# Patient Record
Sex: FEMALE | Race: BLACK OR AFRICAN AMERICAN
[De-identification: names, ages, dates, MRNs, and addresses within clinical notes are randomized per-mention and may not be internally consistent; named-entity substitution may affect disease eponyms.]

---

## 2017-03-20 ENCOUNTER — HOSPITAL ENCOUNTER (OUTPATIENT)
Dept: HOSPITAL 84 - D.US | Age: 77
Discharge: HOME | End: 2017-03-20
Attending: ORTHOPAEDIC SURGERY
Payer: MEDICARE

## 2017-03-20 VITALS — BODY MASS INDEX: 37.9 KG/M2

## 2017-03-20 DIAGNOSIS — R60.0: Primary | ICD-10-CM

## 2017-05-02 ENCOUNTER — HOSPITAL ENCOUNTER (OUTPATIENT)
Dept: HOSPITAL 84 - D.RT | Age: 77
Discharge: HOME | End: 2017-05-02
Attending: INTERNAL MEDICINE
Payer: MEDICARE

## 2017-05-02 VITALS — BODY MASS INDEX: 37.9 KG/M2

## 2017-05-02 DIAGNOSIS — J45.909: Primary | ICD-10-CM

## 2017-05-02 DIAGNOSIS — R06.02: ICD-10-CM

## 2017-05-02 LAB
BUN SERPL-MCNC: 19 MG/DL (ref 7–18)
CREAT SERPL-MCNC: 1 MG/DL (ref 0.6–1.3)

## 2017-11-10 ENCOUNTER — HOSPITAL ENCOUNTER (OUTPATIENT)
Dept: HOSPITAL 84 - D.CT | Age: 77
Discharge: HOME | End: 2017-11-10
Attending: FAMILY MEDICINE
Payer: MEDICARE

## 2017-11-10 VITALS — BODY MASS INDEX: 42.3 KG/M2 | BODY MASS INDEX: 37.9 KG/M2

## 2017-11-10 DIAGNOSIS — R51: ICD-10-CM

## 2017-11-10 DIAGNOSIS — S00.03XA: Primary | ICD-10-CM

## 2018-02-05 ENCOUNTER — HOSPITAL ENCOUNTER (INPATIENT)
Dept: HOSPITAL 84 - D.ER | Age: 78
LOS: 7 days | Discharge: HOME | DRG: 175 | End: 2018-02-12
Attending: FAMILY MEDICINE | Admitting: FAMILY MEDICINE
Payer: MEDICARE

## 2018-02-05 VITALS
BODY MASS INDEX: 41.82 KG/M2 | BODY MASS INDEX: 41.82 KG/M2 | WEIGHT: 207.44 LBS | WEIGHT: 207.44 LBS | BODY MASS INDEX: 41.82 KG/M2 | BODY MASS INDEX: 41.82 KG/M2 | HEIGHT: 59 IN | HEIGHT: 59 IN

## 2018-02-05 DIAGNOSIS — J96.01: ICD-10-CM

## 2018-02-05 DIAGNOSIS — I10: ICD-10-CM

## 2018-02-05 DIAGNOSIS — Z79.01: ICD-10-CM

## 2018-02-05 DIAGNOSIS — R79.89: ICD-10-CM

## 2018-02-05 DIAGNOSIS — I26.02: Primary | ICD-10-CM

## 2018-02-05 DIAGNOSIS — E87.6: ICD-10-CM

## 2018-02-05 DIAGNOSIS — K76.89: ICD-10-CM

## 2018-02-05 DIAGNOSIS — J45.909: ICD-10-CM

## 2018-02-05 DIAGNOSIS — Z86.718: ICD-10-CM

## 2018-02-05 LAB
ALBUMIN SERPL-MCNC: 3.6 G/DL (ref 3.4–5)
ALP SERPL-CCNC: 111 U/L (ref 46–116)
ALT SERPL-CCNC: 22 U/L (ref 10–68)
ANION GAP SERPL CALC-SCNC: 17.9 MMOL/L (ref 8–16)
BASOPHILS NFR BLD AUTO: 0.3 % (ref 0–2)
BILIRUB SERPL-MCNC: 0.38 MG/DL (ref 0.2–1.3)
BUN SERPL-MCNC: 13 MG/DL (ref 7–18)
CALCIUM SERPL-MCNC: 8.8 MG/DL (ref 8.5–10.1)
CHLORIDE SERPL-SCNC: 102 MMOL/L (ref 98–107)
CHOLEST/HDLC SERPL: 4.4 RATIO (ref 2.3–4.1)
CK MB SERPL-MCNC: 3.6 U/L (ref 0–3.6)
CK SERPL-CCNC: 239 UL (ref 21–215)
CO2 SERPL-SCNC: 24.1 MMOL/L (ref 21–32)
CREAT SERPL-MCNC: 1.1 MG/DL (ref 0.6–1.3)
EOSINOPHIL NFR BLD: 0.4 % (ref 0–7)
ERYTHROCYTE [DISTWIDTH] IN BLOOD BY AUTOMATED COUNT: 14.6 % (ref 11.5–14.5)
GLOBULIN SER-MCNC: 3.9 G/L
GLUCOSE SERPL-MCNC: 132 MG/DL (ref 74–106)
HCT VFR BLD CALC: 41.1 % (ref 36–48)
HDLC SERPL-MCNC: 50 MG/DL (ref 32–96)
HGB BLD-MCNC: 13.6 G/DL (ref 12–16)
IMM GRANULOCYTES NFR BLD: 0.1 % (ref 0–5)
LDL-HDL RATIO: 2.9 RATIO (ref 1.5–3.5)
LDLC SERPL-MCNC: 144 MG/DL (ref 0–100)
LYMPHOCYTES NFR BLD AUTO: 35.8 % (ref 15–50)
MAGNESIUM SERPL-MCNC: 2.2 MG/DL (ref 1.8–2.4)
MCH RBC QN AUTO: 29.6 PG (ref 26–34)
MCHC RBC AUTO-ENTMCNC: 33.1 G/DL (ref 31–37)
MCV RBC: 89.3 FL (ref 80–100)
MONOCYTES NFR BLD: 7.7 % (ref 2–11)
NEUTROPHILS NFR BLD AUTO: 55.7 % (ref 40–80)
NT-PROBNP SERPL-MCNC: 3849 PG/ML (ref 0–450)
OSMOLALITY SERPL CALC.SUM OF ELEC: 280 MOSM/KG (ref 275–300)
PLATELET # BLD: 257 10X3/UL (ref 130–400)
PMV BLD AUTO: 9 FL (ref 7.4–10.4)
POTASSIUM SERPL-SCNC: 4 MMOL/L (ref 3.5–5.1)
PROT SERPL-MCNC: 7.5 G/DL (ref 6.4–8.2)
RBC # BLD AUTO: 4.6 10X6/UL (ref 4–5.4)
SODIUM SERPL-SCNC: 140 MMOL/L (ref 136–145)
TRIGL SERPL-MCNC: 139 MG/DL (ref 30–200)
TROPONIN I SERPL-MCNC: 0.63 NG/ML (ref 0–0.06)
TSH SERPL-ACNC: 1.76 UIU/ML (ref 0.36–3.74)
WBC # BLD AUTO: 7 10X3/UL (ref 4.8–10.8)

## 2018-02-06 VITALS — DIASTOLIC BLOOD PRESSURE: 81 MMHG | SYSTOLIC BLOOD PRESSURE: 139 MMHG

## 2018-02-06 VITALS — SYSTOLIC BLOOD PRESSURE: 121 MMHG | DIASTOLIC BLOOD PRESSURE: 67 MMHG

## 2018-02-06 VITALS — DIASTOLIC BLOOD PRESSURE: 64 MMHG | SYSTOLIC BLOOD PRESSURE: 131 MMHG

## 2018-02-06 VITALS — SYSTOLIC BLOOD PRESSURE: 126 MMHG | DIASTOLIC BLOOD PRESSURE: 63 MMHG

## 2018-02-06 VITALS — DIASTOLIC BLOOD PRESSURE: 59 MMHG | SYSTOLIC BLOOD PRESSURE: 116 MMHG

## 2018-02-06 VITALS — DIASTOLIC BLOOD PRESSURE: 60 MMHG | SYSTOLIC BLOOD PRESSURE: 141 MMHG

## 2018-02-06 VITALS — SYSTOLIC BLOOD PRESSURE: 136 MMHG | DIASTOLIC BLOOD PRESSURE: 64 MMHG

## 2018-02-06 VITALS — DIASTOLIC BLOOD PRESSURE: 57 MMHG | SYSTOLIC BLOOD PRESSURE: 122 MMHG

## 2018-02-06 VITALS — SYSTOLIC BLOOD PRESSURE: 112 MMHG | DIASTOLIC BLOOD PRESSURE: 67 MMHG

## 2018-02-06 VITALS — DIASTOLIC BLOOD PRESSURE: 58 MMHG | SYSTOLIC BLOOD PRESSURE: 112 MMHG

## 2018-02-06 VITALS — SYSTOLIC BLOOD PRESSURE: 119 MMHG | DIASTOLIC BLOOD PRESSURE: 72 MMHG

## 2018-02-06 VITALS — SYSTOLIC BLOOD PRESSURE: 113 MMHG | DIASTOLIC BLOOD PRESSURE: 71 MMHG

## 2018-02-06 VITALS — DIASTOLIC BLOOD PRESSURE: 55 MMHG | SYSTOLIC BLOOD PRESSURE: 111 MMHG

## 2018-02-06 LAB
ALBUMIN SERPL-MCNC: 3.2 G/DL (ref 3.4–5)
ALP SERPL-CCNC: 106 U/L (ref 46–116)
ALT SERPL-CCNC: 20 U/L (ref 10–68)
ANION GAP SERPL CALC-SCNC: 14.1 MMOL/L (ref 8–16)
BASOPHILS NFR BLD AUTO: 0.4 % (ref 0–2)
BILIRUB SERPL-MCNC: 0.39 MG/DL (ref 0.2–1.3)
BUN SERPL-MCNC: 11 MG/DL (ref 7–18)
CALCIUM SERPL-MCNC: 8.2 MG/DL (ref 8.5–10.1)
CHLORIDE SERPL-SCNC: 105 MMOL/L (ref 98–107)
CK MB SERPL-MCNC: 2.9 U/L (ref 0–3.6)
CK MB SERPL-MCNC: 3.3 U/L (ref 0–3.6)
CK MB SERPL-MCNC: 3.8 U/L (ref 0–3.6)
CK SERPL-CCNC: 212 UL (ref 21–215)
CK SERPL-CCNC: 219 UL (ref 21–215)
CK SERPL-CCNC: 251 UL (ref 21–215)
CO2 SERPL-SCNC: 27 MMOL/L (ref 21–32)
CREAT SERPL-MCNC: 1 MG/DL (ref 0.6–1.3)
EOSINOPHIL NFR BLD: 0.7 % (ref 0–7)
ERYTHROCYTE [DISTWIDTH] IN BLOOD BY AUTOMATED COUNT: 14.8 % (ref 11.5–14.5)
GLOBULIN SER-MCNC: 3.3 G/L
GLUCOSE SERPL-MCNC: 108 MG/DL (ref 74–106)
HCT VFR BLD CALC: 40.2 % (ref 36–48)
HGB BLD-MCNC: 12.9 G/DL (ref 12–16)
IMM GRANULOCYTES NFR BLD: 0.2 % (ref 0–5)
LYMPHOCYTES NFR BLD AUTO: 49.8 % (ref 15–50)
MCH RBC QN AUTO: 28.9 PG (ref 26–34)
MCHC RBC AUTO-ENTMCNC: 32.1 G/DL (ref 31–37)
MCV RBC: 90.1 FL (ref 80–100)
MONOCYTES NFR BLD: 6.1 % (ref 2–11)
NEUTROPHILS NFR BLD AUTO: 42.8 % (ref 40–80)
OSMOLALITY SERPL CALC.SUM OF ELEC: 282 MOSM/KG (ref 275–300)
PLATELET # BLD: 260 10X3/UL (ref 130–400)
PMV BLD AUTO: 9.2 FL (ref 7.4–10.4)
POTASSIUM SERPL-SCNC: 4.1 MMOL/L (ref 3.5–5.1)
PROT SERPL-MCNC: 6.5 G/DL (ref 6.4–8.2)
RBC # BLD AUTO: 4.46 10X6/UL (ref 4–5.4)
SODIUM SERPL-SCNC: 142 MMOL/L (ref 136–145)
TROPONIN I SERPL-MCNC: 0.27 NG/ML (ref 0–0.06)
TROPONIN I SERPL-MCNC: 0.35 NG/ML (ref 0–0.06)
TROPONIN I SERPL-MCNC: 0.58 NG/ML (ref 0–0.06)
WBC # BLD AUTO: 8.5 10X3/UL (ref 4.8–10.8)

## 2018-02-07 VITALS — DIASTOLIC BLOOD PRESSURE: 55 MMHG | SYSTOLIC BLOOD PRESSURE: 117 MMHG

## 2018-02-07 VITALS — DIASTOLIC BLOOD PRESSURE: 65 MMHG | SYSTOLIC BLOOD PRESSURE: 137 MMHG

## 2018-02-07 VITALS — SYSTOLIC BLOOD PRESSURE: 143 MMHG | DIASTOLIC BLOOD PRESSURE: 79 MMHG

## 2018-02-07 VITALS — DIASTOLIC BLOOD PRESSURE: 67 MMHG | SYSTOLIC BLOOD PRESSURE: 116 MMHG

## 2018-02-07 VITALS — DIASTOLIC BLOOD PRESSURE: 73 MMHG | SYSTOLIC BLOOD PRESSURE: 150 MMHG

## 2018-02-07 VITALS — SYSTOLIC BLOOD PRESSURE: 130 MMHG | DIASTOLIC BLOOD PRESSURE: 58 MMHG

## 2018-02-07 VITALS — SYSTOLIC BLOOD PRESSURE: 120 MMHG | DIASTOLIC BLOOD PRESSURE: 68 MMHG

## 2018-02-07 VITALS — DIASTOLIC BLOOD PRESSURE: 66 MMHG | SYSTOLIC BLOOD PRESSURE: 119 MMHG

## 2018-02-07 VITALS — SYSTOLIC BLOOD PRESSURE: 116 MMHG | DIASTOLIC BLOOD PRESSURE: 71 MMHG

## 2018-02-07 VITALS — DIASTOLIC BLOOD PRESSURE: 75 MMHG | SYSTOLIC BLOOD PRESSURE: 154 MMHG

## 2018-02-07 VITALS — SYSTOLIC BLOOD PRESSURE: 130 MMHG | DIASTOLIC BLOOD PRESSURE: 69 MMHG

## 2018-02-07 VITALS — DIASTOLIC BLOOD PRESSURE: 63 MMHG | SYSTOLIC BLOOD PRESSURE: 130 MMHG

## 2018-02-07 VITALS — DIASTOLIC BLOOD PRESSURE: 76 MMHG | SYSTOLIC BLOOD PRESSURE: 125 MMHG

## 2018-02-07 VITALS — SYSTOLIC BLOOD PRESSURE: 130 MMHG | DIASTOLIC BLOOD PRESSURE: 64 MMHG

## 2018-02-07 VITALS — DIASTOLIC BLOOD PRESSURE: 70 MMHG | SYSTOLIC BLOOD PRESSURE: 136 MMHG

## 2018-02-07 VITALS — SYSTOLIC BLOOD PRESSURE: 145 MMHG | DIASTOLIC BLOOD PRESSURE: 73 MMHG

## 2018-02-07 VITALS — SYSTOLIC BLOOD PRESSURE: 162 MMHG | DIASTOLIC BLOOD PRESSURE: 74 MMHG

## 2018-02-07 VITALS — DIASTOLIC BLOOD PRESSURE: 66 MMHG | SYSTOLIC BLOOD PRESSURE: 156 MMHG

## 2018-02-07 VITALS — DIASTOLIC BLOOD PRESSURE: 62 MMHG | SYSTOLIC BLOOD PRESSURE: 118 MMHG

## 2018-02-07 VITALS — SYSTOLIC BLOOD PRESSURE: 117 MMHG | DIASTOLIC BLOOD PRESSURE: 66 MMHG

## 2018-02-07 LAB
ALBUMIN SERPL-MCNC: 3.1 G/DL (ref 3.4–5)
ALP SERPL-CCNC: 97 U/L (ref 46–116)
ALT SERPL-CCNC: 19 U/L (ref 10–68)
ANION GAP SERPL CALC-SCNC: 15 MMOL/L (ref 8–16)
BASOPHILS NFR BLD AUTO: 0.2 % (ref 0–2)
BILIRUB SERPL-MCNC: 0.37 MG/DL (ref 0.2–1.3)
BUN SERPL-MCNC: 10 MG/DL (ref 7–18)
CALCIUM SERPL-MCNC: 8.2 MG/DL (ref 8.5–10.1)
CARDIOLIPIN IGG SER IA-ACNC: <9 GPL U/ML (ref 0–14)
CARDIOLIPIN IGM SER IA-ACNC: 12 MPL U/ML (ref 0–12)
CHLORIDE SERPL-SCNC: 105 MMOL/L (ref 98–107)
CO2 SERPL-SCNC: 23.4 MMOL/L (ref 21–32)
CREAT SERPL-MCNC: 1 MG/DL (ref 0.6–1.3)
EOSINOPHIL NFR BLD: 0.7 % (ref 0–7)
ERYTHROCYTE [DISTWIDTH] IN BLOOD BY AUTOMATED COUNT: 14.8 % (ref 11.5–14.5)
GLOBULIN SER-MCNC: 3.5 G/L
GLUCOSE SERPL-MCNC: 146 MG/DL (ref 74–106)
HCT VFR BLD CALC: 37.5 % (ref 36–48)
HGB BLD-MCNC: 12 G/DL (ref 12–16)
IMM GRANULOCYTES NFR BLD: 0.2 % (ref 0–5)
LYMPHOCYTES NFR BLD AUTO: 44.8 % (ref 15–50)
MCH RBC QN AUTO: 28.8 PG (ref 26–34)
MCHC RBC AUTO-ENTMCNC: 32 G/DL (ref 31–37)
MCV RBC: 89.9 FL (ref 80–100)
MONOCYTES NFR BLD: 7.8 % (ref 2–11)
NEUTROPHILS NFR BLD AUTO: 46.3 % (ref 40–80)
OSMOLALITY SERPL CALC.SUM OF ELEC: 280 MOSM/KG (ref 275–300)
PLATELET # BLD: 277 10X3/UL (ref 130–400)
PMV BLD AUTO: 9.3 FL (ref 7.4–10.4)
POTASSIUM SERPL-SCNC: 3.4 MMOL/L (ref 3.5–5.1)
PROT SERPL-MCNC: 6.6 G/DL (ref 6.4–8.2)
RBC # BLD AUTO: 4.17 10X6/UL (ref 4–5.4)
SODIUM SERPL-SCNC: 140 MMOL/L (ref 136–145)
WBC # BLD AUTO: 8.1 10X3/UL (ref 4.8–10.8)

## 2018-02-08 VITALS — DIASTOLIC BLOOD PRESSURE: 78 MMHG | SYSTOLIC BLOOD PRESSURE: 149 MMHG

## 2018-02-08 VITALS — SYSTOLIC BLOOD PRESSURE: 124 MMHG | DIASTOLIC BLOOD PRESSURE: 58 MMHG

## 2018-02-08 VITALS — SYSTOLIC BLOOD PRESSURE: 130 MMHG | DIASTOLIC BLOOD PRESSURE: 70 MMHG

## 2018-02-08 VITALS — SYSTOLIC BLOOD PRESSURE: 139 MMHG | DIASTOLIC BLOOD PRESSURE: 64 MMHG

## 2018-02-08 VITALS — SYSTOLIC BLOOD PRESSURE: 122 MMHG | DIASTOLIC BLOOD PRESSURE: 76 MMHG

## 2018-02-08 VITALS — SYSTOLIC BLOOD PRESSURE: 147 MMHG | DIASTOLIC BLOOD PRESSURE: 64 MMHG

## 2018-02-08 VITALS — DIASTOLIC BLOOD PRESSURE: 93 MMHG | SYSTOLIC BLOOD PRESSURE: 134 MMHG

## 2018-02-08 VITALS — DIASTOLIC BLOOD PRESSURE: 62 MMHG | SYSTOLIC BLOOD PRESSURE: 125 MMHG

## 2018-02-08 VITALS — DIASTOLIC BLOOD PRESSURE: 64 MMHG | SYSTOLIC BLOOD PRESSURE: 147 MMHG

## 2018-02-08 VITALS — DIASTOLIC BLOOD PRESSURE: 82 MMHG | SYSTOLIC BLOOD PRESSURE: 133 MMHG

## 2018-02-08 VITALS — DIASTOLIC BLOOD PRESSURE: 69 MMHG | SYSTOLIC BLOOD PRESSURE: 145 MMHG

## 2018-02-08 VITALS — DIASTOLIC BLOOD PRESSURE: 76 MMHG | SYSTOLIC BLOOD PRESSURE: 119 MMHG

## 2018-02-08 VITALS — DIASTOLIC BLOOD PRESSURE: 74 MMHG | SYSTOLIC BLOOD PRESSURE: 130 MMHG

## 2018-02-08 VITALS — SYSTOLIC BLOOD PRESSURE: 136 MMHG | DIASTOLIC BLOOD PRESSURE: 70 MMHG

## 2018-02-08 VITALS — DIASTOLIC BLOOD PRESSURE: 68 MMHG | SYSTOLIC BLOOD PRESSURE: 143 MMHG

## 2018-02-08 VITALS — SYSTOLIC BLOOD PRESSURE: 152 MMHG | DIASTOLIC BLOOD PRESSURE: 66 MMHG

## 2018-02-08 VITALS — SYSTOLIC BLOOD PRESSURE: 122 MMHG | DIASTOLIC BLOOD PRESSURE: 92 MMHG

## 2018-02-08 VITALS — DIASTOLIC BLOOD PRESSURE: 68 MMHG | SYSTOLIC BLOOD PRESSURE: 117 MMHG

## 2018-02-08 VITALS — SYSTOLIC BLOOD PRESSURE: 134 MMHG | DIASTOLIC BLOOD PRESSURE: 93 MMHG

## 2018-02-08 VITALS — SYSTOLIC BLOOD PRESSURE: 132 MMHG | DIASTOLIC BLOOD PRESSURE: 48 MMHG

## 2018-02-08 VITALS — DIASTOLIC BLOOD PRESSURE: 64 MMHG | SYSTOLIC BLOOD PRESSURE: 139 MMHG

## 2018-02-08 LAB
ALBUMIN SERPL-MCNC: 2.8 G/DL (ref 3.4–5)
ALP SERPL-CCNC: 89 U/L (ref 46–116)
ALT SERPL-CCNC: 18 U/L (ref 10–68)
ANION GAP SERPL CALC-SCNC: 13.1 MMOL/L (ref 8–16)
AT III ACT/NOR PPP CHRO: 121 % (ref 75–135)
BASOPHILS NFR BLD AUTO: 0.1 % (ref 0–2)
BILIRUB SERPL-MCNC: 0.49 MG/DL (ref 0.2–1.3)
BUN SERPL-MCNC: 7 MG/DL (ref 7–18)
CALCIUM SERPL-MCNC: 8.2 MG/DL (ref 8.5–10.1)
CHLORIDE SERPL-SCNC: 107 MMOL/L (ref 98–107)
CO2 SERPL-SCNC: 24.3 MMOL/L (ref 21–32)
CREAT SERPL-MCNC: 0.9 MG/DL (ref 0.6–1.3)
EOSINOPHIL NFR BLD: 1 % (ref 0–7)
ERYTHROCYTE [DISTWIDTH] IN BLOOD BY AUTOMATED COUNT: 14.9 % (ref 11.5–14.5)
GLOBULIN SER-MCNC: 3.5 G/L
GLUCOSE SERPL-MCNC: 139 MG/DL (ref 74–106)
HCT VFR BLD CALC: 34.8 % (ref 36–48)
HGB BLD-MCNC: 11.2 G/DL (ref 12–16)
IMM GRANULOCYTES NFR BLD: 0.1 % (ref 0–5)
LYMPHOCYTES NFR BLD AUTO: 34 % (ref 15–50)
MCH RBC QN AUTO: 29.2 PG (ref 26–34)
MCHC RBC AUTO-ENTMCNC: 32.2 G/DL (ref 31–37)
MCV RBC: 90.9 FL (ref 80–100)
MONOCYTES NFR BLD: 5.6 % (ref 2–11)
NEUTROPHILS NFR BLD AUTO: 59.2 % (ref 40–80)
OSMOLALITY SERPL CALC.SUM OF ELEC: 280 MOSM/KG (ref 275–300)
PLATELET # BLD: 288 10X3/UL (ref 130–400)
PMV BLD AUTO: 9.2 FL (ref 7.4–10.4)
POTASSIUM SERPL-SCNC: 3.4 MMOL/L (ref 3.5–5.1)
PROT S ACT/NOR PPP: 100 % (ref 57–157)
PROT S ACT/NOR PPP: 93 % (ref 63–140)
PROT S PPP-ACNC: 132 % (ref 60–150)
PROT SERPL-MCNC: 6.3 G/DL (ref 6.4–8.2)
RBC # BLD AUTO: 3.83 10X6/UL (ref 4–5.4)
SODIUM SERPL-SCNC: 141 MMOL/L (ref 136–145)
WBC # BLD AUTO: 9.2 10X3/UL (ref 4.8–10.8)

## 2018-02-09 VITALS — SYSTOLIC BLOOD PRESSURE: 165 MMHG | DIASTOLIC BLOOD PRESSURE: 68 MMHG

## 2018-02-09 VITALS — SYSTOLIC BLOOD PRESSURE: 130 MMHG | DIASTOLIC BLOOD PRESSURE: 70 MMHG

## 2018-02-09 VITALS — SYSTOLIC BLOOD PRESSURE: 126 MMHG | DIASTOLIC BLOOD PRESSURE: 57 MMHG

## 2018-02-09 VITALS — DIASTOLIC BLOOD PRESSURE: 69 MMHG | SYSTOLIC BLOOD PRESSURE: 119 MMHG

## 2018-02-09 VITALS — SYSTOLIC BLOOD PRESSURE: 130 MMHG | DIASTOLIC BLOOD PRESSURE: 62 MMHG

## 2018-02-09 VITALS — SYSTOLIC BLOOD PRESSURE: 119 MMHG | DIASTOLIC BLOOD PRESSURE: 64 MMHG

## 2018-02-09 VITALS — DIASTOLIC BLOOD PRESSURE: 70 MMHG | SYSTOLIC BLOOD PRESSURE: 130 MMHG

## 2018-02-09 VITALS — SYSTOLIC BLOOD PRESSURE: 148 MMHG | DIASTOLIC BLOOD PRESSURE: 66 MMHG

## 2018-02-09 VITALS — DIASTOLIC BLOOD PRESSURE: 56 MMHG | SYSTOLIC BLOOD PRESSURE: 140 MMHG

## 2018-02-09 VITALS — SYSTOLIC BLOOD PRESSURE: 129 MMHG | DIASTOLIC BLOOD PRESSURE: 47 MMHG

## 2018-02-09 VITALS — SYSTOLIC BLOOD PRESSURE: 123 MMHG | DIASTOLIC BLOOD PRESSURE: 57 MMHG

## 2018-02-09 VITALS — DIASTOLIC BLOOD PRESSURE: 54 MMHG | SYSTOLIC BLOOD PRESSURE: 127 MMHG

## 2018-02-09 VITALS — SYSTOLIC BLOOD PRESSURE: 123 MMHG | DIASTOLIC BLOOD PRESSURE: 62 MMHG

## 2018-02-09 VITALS — DIASTOLIC BLOOD PRESSURE: 82 MMHG | SYSTOLIC BLOOD PRESSURE: 147 MMHG

## 2018-02-09 VITALS — DIASTOLIC BLOOD PRESSURE: 55 MMHG | SYSTOLIC BLOOD PRESSURE: 138 MMHG

## 2018-02-09 VITALS — SYSTOLIC BLOOD PRESSURE: 151 MMHG | DIASTOLIC BLOOD PRESSURE: 67 MMHG

## 2018-02-09 VITALS — DIASTOLIC BLOOD PRESSURE: 71 MMHG | SYSTOLIC BLOOD PRESSURE: 120 MMHG

## 2018-02-09 LAB
ALBUMIN SERPL-MCNC: 3.1 G/DL (ref 3.4–5)
ALP SERPL-CCNC: 108 U/L (ref 46–116)
ALT SERPL-CCNC: 25 U/L (ref 10–68)
ANION GAP SERPL CALC-SCNC: 15.4 MMOL/L (ref 8–16)
APTT 1H NP PPP: 28.1 SEC (ref 22.9–30.2)
APTT IMM NP PPP: 26.3 SEC (ref 22.9–30.2)
APTT PPP 1:1 SALINE: >121.9 SEC
APTT PPP: 30.8 SEC (ref 22.9–30.2)
BASOPHILS NFR BLD AUTO: 0.2 % (ref 0–2)
BILIRUB SERPL-MCNC: 0.21 MG/DL (ref 0.2–1.3)
BUN SERPL-MCNC: 7 MG/DL (ref 7–18)
CALCIUM SERPL-MCNC: 8.4 MG/DL (ref 8.5–10.1)
CHLORIDE SERPL-SCNC: 105 MMOL/L (ref 98–107)
CO2 SERPL-SCNC: 23.3 MMOL/L (ref 21–32)
CREAT SERPL-MCNC: 1 MG/DL (ref 0.6–1.3)
EOSINOPHIL NFR BLD: 0.9 % (ref 0–7)
ERYTHROCYTE [DISTWIDTH] IN BLOOD BY AUTOMATED COUNT: 14.9 % (ref 11.5–14.5)
FACT VIII ACT/NOR PPP: 178 % (ref 57–163)
GLOBULIN SER-MCNC: 3.9 G/L
GLUCOSE SERPL-MCNC: 149 MG/DL (ref 74–106)
HCT VFR BLD CALC: 35.1 % (ref 36–48)
HGB BLD-MCNC: 11.2 G/DL (ref 12–16)
IMM GRANULOCYTES NFR BLD: 0.3 % (ref 0–5)
LYMPHOCYTES NFR BLD AUTO: 21.7 % (ref 15–50)
MCH RBC QN AUTO: 29 PG (ref 26–34)
MCHC RBC AUTO-ENTMCNC: 31.9 G/DL (ref 31–37)
MCV RBC: 90.9 FL (ref 80–100)
MONOCYTES NFR BLD: 6 % (ref 2–11)
NEUTROPHILS NFR BLD AUTO: 70.9 % (ref 40–80)
OSMOLALITY SERPL CALC.SUM OF ELEC: 279 MOSM/KG (ref 275–300)
PLATELET # BLD: 274 10X3/UL (ref 130–400)
PMV BLD AUTO: 8.8 FL (ref 7.4–10.4)
POTASSIUM SERPL-SCNC: 3.7 MMOL/L (ref 3.5–5.1)
PROT C ACT/NOR PPP: 85 % (ref 73–180)
PROT C AG PPP IA-ACNC: 75 % (ref 60–150)
PROT SERPL-MCNC: 7 G/DL (ref 6.4–8.2)
RBC # BLD AUTO: 3.86 10X6/UL (ref 4–5.4)
SODIUM SERPL-SCNC: 140 MMOL/L (ref 136–145)
WBC # BLD AUTO: 10.9 10X3/UL (ref 4.8–10.8)

## 2018-02-10 VITALS — SYSTOLIC BLOOD PRESSURE: 146 MMHG | DIASTOLIC BLOOD PRESSURE: 65 MMHG

## 2018-02-10 VITALS — DIASTOLIC BLOOD PRESSURE: 67 MMHG | SYSTOLIC BLOOD PRESSURE: 151 MMHG

## 2018-02-10 VITALS — SYSTOLIC BLOOD PRESSURE: 161 MMHG | DIASTOLIC BLOOD PRESSURE: 67 MMHG

## 2018-02-10 VITALS — DIASTOLIC BLOOD PRESSURE: 75 MMHG | SYSTOLIC BLOOD PRESSURE: 150 MMHG

## 2018-02-10 VITALS — SYSTOLIC BLOOD PRESSURE: 146 MMHG | DIASTOLIC BLOOD PRESSURE: 68 MMHG

## 2018-02-10 VITALS — SYSTOLIC BLOOD PRESSURE: 150 MMHG | DIASTOLIC BLOOD PRESSURE: 72 MMHG

## 2018-02-10 VITALS — SYSTOLIC BLOOD PRESSURE: 151 MMHG | DIASTOLIC BLOOD PRESSURE: 67 MMHG

## 2018-02-10 VITALS — SYSTOLIC BLOOD PRESSURE: 117 MMHG | DIASTOLIC BLOOD PRESSURE: 57 MMHG

## 2018-02-10 VITALS — DIASTOLIC BLOOD PRESSURE: 69 MMHG | SYSTOLIC BLOOD PRESSURE: 121 MMHG

## 2018-02-10 LAB
ALBUMIN SERPL-MCNC: 2.8 G/DL (ref 3.4–5)
ALP SERPL-CCNC: 102 U/L (ref 46–116)
ALT SERPL-CCNC: 29 U/L (ref 10–68)
ANION GAP SERPL CALC-SCNC: 13.1 MMOL/L (ref 8–16)
BASOPHILS NFR BLD AUTO: 0.1 % (ref 0–2)
BILIRUB SERPL-MCNC: 0.3 MG/DL (ref 0.2–1.3)
BUN SERPL-MCNC: 5 MG/DL (ref 7–18)
CALCIUM SERPL-MCNC: 8.3 MG/DL (ref 8.5–10.1)
CHLORIDE SERPL-SCNC: 107 MMOL/L (ref 98–107)
CO2 SERPL-SCNC: 25.3 MMOL/L (ref 21–32)
CREAT SERPL-MCNC: 0.8 MG/DL (ref 0.6–1.3)
EOSINOPHIL NFR BLD: 1.1 % (ref 0–7)
ERYTHROCYTE [DISTWIDTH] IN BLOOD BY AUTOMATED COUNT: 14.8 % (ref 11.5–14.5)
GLOBULIN SER-MCNC: 3.6 G/L
GLUCOSE SERPL-MCNC: 117 MG/DL (ref 74–106)
HCT VFR BLD CALC: 33.1 % (ref 36–48)
HGB BLD-MCNC: 10.5 G/DL (ref 12–16)
IMM GRANULOCYTES NFR BLD: 0.2 % (ref 0–5)
LYMPHOCYTES NFR BLD AUTO: 20.6 % (ref 15–50)
MCH RBC QN AUTO: 28.6 PG (ref 26–34)
MCHC RBC AUTO-ENTMCNC: 31.7 G/DL (ref 31–37)
MCV RBC: 90.2 FL (ref 80–100)
MONOCYTES NFR BLD: 3.6 % (ref 2–11)
NEUTROPHILS NFR BLD AUTO: 74.4 % (ref 40–80)
OSMOLALITY SERPL CALC.SUM OF ELEC: 280 MOSM/KG (ref 275–300)
PLATELET # BLD: 294 10X3/UL (ref 130–400)
PMV BLD AUTO: 8.8 FL (ref 7.4–10.4)
POTASSIUM SERPL-SCNC: 3.4 MMOL/L (ref 3.5–5.1)
PROT SERPL-MCNC: 6.4 G/DL (ref 6.4–8.2)
RBC # BLD AUTO: 3.67 10X6/UL (ref 4–5.4)
SODIUM SERPL-SCNC: 142 MMOL/L (ref 136–145)
WBC # BLD AUTO: 9.4 10X3/UL (ref 4.8–10.8)

## 2018-02-11 VITALS — DIASTOLIC BLOOD PRESSURE: 68 MMHG | SYSTOLIC BLOOD PRESSURE: 154 MMHG

## 2018-02-11 VITALS — DIASTOLIC BLOOD PRESSURE: 78 MMHG | SYSTOLIC BLOOD PRESSURE: 143 MMHG

## 2018-02-11 VITALS — DIASTOLIC BLOOD PRESSURE: 72 MMHG | SYSTOLIC BLOOD PRESSURE: 177 MMHG

## 2018-02-11 VITALS — DIASTOLIC BLOOD PRESSURE: 65 MMHG | SYSTOLIC BLOOD PRESSURE: 151 MMHG

## 2018-02-11 VITALS — SYSTOLIC BLOOD PRESSURE: 149 MMHG | DIASTOLIC BLOOD PRESSURE: 60 MMHG

## 2018-02-11 LAB
ALBUMIN SERPL-MCNC: 3 G/DL (ref 3.4–5)
ALP SERPL-CCNC: 113 U/L (ref 46–116)
ALT SERPL-CCNC: 32 U/L (ref 10–68)
ANION GAP SERPL CALC-SCNC: 11.8 MMOL/L (ref 8–16)
BASOPHILS NFR BLD AUTO: 0.2 % (ref 0–2)
BILIRUB SERPL-MCNC: 0.2 MG/DL (ref 0.2–1.3)
BUN SERPL-MCNC: 5 MG/DL (ref 7–18)
CALCIUM SERPL-MCNC: 8.5 MG/DL (ref 8.5–10.1)
CHLORIDE SERPL-SCNC: 106 MMOL/L (ref 98–107)
CO2 SERPL-SCNC: 26.4 MMOL/L (ref 21–32)
CREAT SERPL-MCNC: 0.9 MG/DL (ref 0.6–1.3)
EOSINOPHIL NFR BLD: 1.2 % (ref 0–7)
ERYTHROCYTE [DISTWIDTH] IN BLOOD BY AUTOMATED COUNT: 14.7 % (ref 11.5–14.5)
GLOBULIN SER-MCNC: 3.7 G/L
GLUCOSE SERPL-MCNC: 117 MG/DL (ref 74–106)
HCT VFR BLD CALC: 35.3 % (ref 36–48)
HGB BLD-MCNC: 11.1 G/DL (ref 12–16)
IMM GRANULOCYTES NFR BLD: 0.4 % (ref 0–5)
LYMPHOCYTES NFR BLD AUTO: 25.5 % (ref 15–50)
MCH RBC QN AUTO: 28.4 PG (ref 26–34)
MCHC RBC AUTO-ENTMCNC: 31.4 G/DL (ref 31–37)
MCV RBC: 90.3 FL (ref 80–100)
MONOCYTES NFR BLD: 5.5 % (ref 2–11)
NEUTROPHILS NFR BLD AUTO: 67.2 % (ref 40–80)
OSMOLALITY SERPL CALC.SUM OF ELEC: 278 MOSM/KG (ref 275–300)
PLATELET # BLD: 352 10X3/UL (ref 130–400)
PMV BLD AUTO: 8.9 FL (ref 7.4–10.4)
POTASSIUM SERPL-SCNC: 3.2 MMOL/L (ref 3.5–5.1)
PROT SERPL-MCNC: 6.7 G/DL (ref 6.4–8.2)
RBC # BLD AUTO: 3.91 10X6/UL (ref 4–5.4)
SODIUM SERPL-SCNC: 141 MMOL/L (ref 136–145)
WBC # BLD AUTO: 9 10X3/UL (ref 4.8–10.8)

## 2018-02-12 VITALS — SYSTOLIC BLOOD PRESSURE: 156 MMHG | DIASTOLIC BLOOD PRESSURE: 71 MMHG

## 2018-02-12 VITALS — DIASTOLIC BLOOD PRESSURE: 66 MMHG | SYSTOLIC BLOOD PRESSURE: 147 MMHG

## 2018-02-12 VITALS — SYSTOLIC BLOOD PRESSURE: 145 MMHG | DIASTOLIC BLOOD PRESSURE: 60 MMHG

## 2018-02-12 LAB
ALBUMIN SERPL-MCNC: 3 G/DL (ref 3.4–5)
ALP SERPL-CCNC: 106 U/L (ref 46–116)
ALT SERPL-CCNC: 34 U/L (ref 10–68)
ANION GAP SERPL CALC-SCNC: 12.2 MMOL/L (ref 8–16)
BASOPHILS NFR BLD AUTO: 0.2 % (ref 0–2)
BILIRUB SERPL-MCNC: 0.3 MG/DL (ref 0.2–1.3)
BUN SERPL-MCNC: 4 MG/DL (ref 7–18)
CALCIUM SERPL-MCNC: 8.4 MG/DL (ref 8.5–10.1)
CHLORIDE SERPL-SCNC: 104 MMOL/L (ref 98–107)
CO2 SERPL-SCNC: 27.5 MMOL/L (ref 21–32)
CREAT SERPL-MCNC: 0.8 MG/DL (ref 0.6–1.3)
EOSINOPHIL NFR BLD: 1.6 % (ref 0–7)
ERYTHROCYTE [DISTWIDTH] IN BLOOD BY AUTOMATED COUNT: 14.6 % (ref 11.5–14.5)
GLOBULIN SER-MCNC: 3.8 G/L
GLUCOSE SERPL-MCNC: 113 MG/DL (ref 74–106)
HCT VFR BLD CALC: 35.7 % (ref 36–48)
HGB BLD-MCNC: 11.2 G/DL (ref 12–16)
IMM GRANULOCYTES NFR BLD: 0.2 % (ref 0–5)
LYMPHOCYTES NFR BLD AUTO: 32.5 % (ref 15–50)
MCH RBC QN AUTO: 28.5 PG (ref 26–34)
MCHC RBC AUTO-ENTMCNC: 31.4 G/DL (ref 31–37)
MCV RBC: 90.8 FL (ref 80–100)
MONOCYTES NFR BLD: 6.9 % (ref 2–11)
NEUTROPHILS NFR BLD AUTO: 58.6 % (ref 40–80)
OSMOLALITY SERPL CALC.SUM OF ELEC: 276 MOSM/KG (ref 275–300)
PLATELET # BLD: 399 10X3/UL (ref 130–400)
PMV BLD AUTO: 9 FL (ref 7.4–10.4)
POTASSIUM SERPL-SCNC: 3.7 MMOL/L (ref 3.5–5.1)
PROT SERPL-MCNC: 6.8 G/DL (ref 6.4–8.2)
RBC # BLD AUTO: 3.93 10X6/UL (ref 4–5.4)
SODIUM SERPL-SCNC: 140 MMOL/L (ref 136–145)
WBC # BLD AUTO: 9.2 10X3/UL (ref 4.8–10.8)

## 2019-01-21 ENCOUNTER — HOSPITAL ENCOUNTER (OUTPATIENT)
Dept: HOSPITAL 84 - D.ECHO | Age: 79
Discharge: HOME | End: 2019-01-21
Attending: INTERNAL MEDICINE
Payer: MEDICARE

## 2019-01-21 VITALS — BODY MASS INDEX: 42.3 KG/M2

## 2019-01-21 DIAGNOSIS — J45.991: ICD-10-CM

## 2019-01-21 DIAGNOSIS — Z86.718: Primary | ICD-10-CM

## 2019-01-21 DIAGNOSIS — I27.20: ICD-10-CM

## 2019-02-02 NOTE — EC
PATIENT:SHONDA MARIE                  DATE OF SERVICE: 01/21/19
SEX: F                                  MEDICAL RECORD: [id number]
DATE OF BIRTH: [birth year]                        LOCATION:D.[facility]          
AGE OF PATIENT: 78                             ADMISSION DATE: 01/21/19
 
REFERRING PHYSICIAN:                               
 
INTERPRETING PHYSICIAN: JILLIAN LI MD          
 
 
 
                             ECHOCARDIOGRAM REPORT
  ECHO CHARGES 4               ECHO COMPLETE                 Date: 01/21/19
 
 
 
CLINICAL DIAGNOSIS: PULMONARY HTN                 
 
                         ECHOCARDIOGRAPHIC MEASUREMENTS
      (adult normal given)
   AC root (d.<3.7cm) 2.8  cm   LV Septum d (<1.2 cm> 0.9  cm
      Valve Excursion 1.5  cm     LV Septum (systole) 1.6  cm
Left Atria (s.<4.0cm> 3.9  cm          LVPW d(<1.2cm) 1.2  cm
        RV (d.<2.3cm) 2.3  cm           LVPW (sytole) 2.0  cm
  LV diastole(<5.6CM) 5.4  cm       MV E-F(>70mm/sec)      cm
           LV systole 2.5  cm           LVOT Diameter 1.6  cm
       MV exc.(>10mm)      cm
Est.ejection fraction (50-75%)     %
 
   DOPPLER:
     LVIT      cm/sec A 76.0 cm/sec E 57.0  cm/sec
       LA      cm/sec      RVSP 20.0 mmHg
     LVOT 137  cm/sec   AOP1/2T      m/s
  Asc. Ao 174  cm/sec
     RVOT 62.0 cm/sec
       RA      cm/sec
         cm/sec
 AV Gradient Peak 12.1 mmHg  AV Mean 5.8  mmHg  AV Area 1.3  cm
 MV Gradient Peak 3.6  mmHg  MV Mean 0.93 mmHg  MV Area      cm
   COMMENTS:                                              
 
 
 Cardiac Sonographer: Feng GOMEZOE            
      Cardiologist: 3          Dr. Stubbs             
             TAPE# PACS           
                                       Pericardial Effusion N                        
 
 
DATE OF SERVICE:  01/21/2019
 
Adequate 2-D echo, color flow and spectral Doppler, and M-Mode.
 
No LVH.  LV internal dimension is normal.  Wall motion is normal.  EF is greater
than or equal to 55%.  Aortic valve is tricuspid.  No evidence of stenosis by
Doppler interrogation.  Left atrium is normal.  Mitral valve shows no prolapse. 
Trivial MR only.  Right-sided chamber is grossly normal.  Trivial TR only.
 
TRANSINT:[id number] Voice Confirmation ID: [id number] DOCUMENT ID: [id number]
 
 
 
ECHOCARDIOGRAM REPORT                          [id number]    SHONDA MARIE,JILLIAN SANTIAGO MD          
 
 
 
Electronically Signed by JILLIAN LI on 02/02/19 at 1254
 
 
 
 
 
 
 
 
 
 
 
 
 
 
 
 
 
 
 
 
 
 
 
 
 
 
 
 
 
 
 
 
 
 
 
 
 
 
 
 
CC:                                                             [id number]
DICTATION DATE: 01/21/19 1536     :     01/21/19 1718      DEP CLI 
                                                                      01/21/19
[place] [id number] [place] [id number]

## 2019-02-06 ENCOUNTER — HOSPITAL ENCOUNTER (EMERGENCY)
Dept: HOSPITAL 84 - D.ER | Age: 79
Discharge: HOME | End: 2019-02-06
Payer: MEDICARE

## 2019-02-06 VITALS — SYSTOLIC BLOOD PRESSURE: 163 MMHG | DIASTOLIC BLOOD PRESSURE: 67 MMHG

## 2019-02-06 VITALS
HEIGHT: 59 IN | HEIGHT: 59 IN | BODY MASS INDEX: 42.02 KG/M2 | BODY MASS INDEX: 42.02 KG/M2 | WEIGHT: 208.44 LBS | WEIGHT: 208.44 LBS

## 2019-02-06 DIAGNOSIS — M54.31: Primary | ICD-10-CM

## 2019-07-17 ENCOUNTER — HOSPITAL ENCOUNTER (OUTPATIENT)
Dept: HOSPITAL 84 - D.MRI | Age: 79
Discharge: HOME | End: 2019-07-17
Attending: CLINICAL NURSE SPECIALIST
Payer: MEDICARE

## 2019-07-17 VITALS — BODY MASS INDEX: 42.1 KG/M2

## 2019-07-17 DIAGNOSIS — M54.12: ICD-10-CM

## 2019-07-17 DIAGNOSIS — M25.511: Primary | ICD-10-CM

## 2019-08-20 ENCOUNTER — HOSPITAL ENCOUNTER (OUTPATIENT)
Dept: HOSPITAL 84 - D.HCCARDIO | Age: 79
Discharge: HOME | End: 2019-08-20
Attending: INTERNAL MEDICINE
Payer: MEDICARE

## 2019-08-20 VITALS — BODY MASS INDEX: 42.1 KG/M2

## 2019-08-20 DIAGNOSIS — I48.91: Primary | ICD-10-CM

## 2019-08-29 NOTE — EC
PATIENT:SHONDA MARIE                  DATE OF SERVICE: 08/20/19
SEX: F                                  MEDICAL RECORD: [id number]
DATE OF BIRTH: [birth year]                        LOCATION:D.[facility]          
AGE OF PATIENT: 79                             ADMISSION DATE: 08/20/19
 
REFERRING PHYSICIAN:                               
 
INTERPRETING PHYSICIAN: JILLIAN LI MD          
 
 
 
                             ECHOCARDIOGRAM REPORT
  ECHO CHARGES 4               ECHO COMPLETE                 Date: 08/20/19
 
 
 
CLINICAL DIAGNOSIS: H/O HTN/PULMONARY EMBOLUS/    
                    A-FIB                         
                         ECHOCARDIOGRAPHIC MEASUREMENTS
      (adult normal given)
   AC root (d.<3.7cm) 3.1  cm   LV Septum d (<1.2 cm> 0.9  cm
      Valve Excursion 1.4  cm     LV Septum (systole) 1.5  cm
Left Atria (s.<4.0cm> 3.9  cm          LVPW d(<1.2cm) 1.0  cm
        RV (d.<2.3cm) 2.7  cm           LVPW (sytole) 1.7  cm
  LV diastole(<5.6CM) 5.4  cm       MV E-F(>70mm/sec)      cm
           LV systole 3.0  cm           LVOT Diameter 1.6  cm
       MV exc.(>10mm)      cm
Est.ejection fraction (50-75%)     %
 
   DOPPLER:
     LVIT      cm/sec A 83.0 cm/sec E 47.0  cm/sec
       LA      cm/sec      RVSP 22.0 mmHg
     LVOT 114  cm/sec   AOP1/2T      m/s
  Asc. Ao 179  cm/sec
     RVOT 67.0 cm/sec
       RA      cm/sec
       PA 84.0 cm/sec
 AV Gradient Peak 13.0 mmHg  AV Mean 6.6  mmHg  AV Area 1.2  cm
 MV Gradient Peak 4.1  mmHg  MV Mean 1.3  mmHg  MV Area      cm
   COMMENTS: OP - HC                                      
 
 
 Cardiac Sonographer: 1               LORENA Prescott            
      Cardiologist: 3          Dr. Stubbs             
             TAPE# PACS           
                                       Pericardial Effusion N                        
 
 
DATE OF SERVICE:  08/20/2019
 
Adequate 2D, color flow imaging, spectral Doppler, and M-Mode 
 
No LVH.  LV internal dimensions are normal.  Wall motion is normal.  EF is
greater than or equal to 55%.  Aortic valve is tricuspid.  No evidence of
stenosis by Doppler interrogation. Left atrium is normal.  Mitral valve shows no
prolapse.  Trace MR.  Right-sided chambers are grossly normal.  Trace TR.
 
TRANSINT:[id number] Voice Confirmation ID: [id number] DOCUMENT ID: [id number]
 
 
 
ECHOCARDIOGRAM REPORT                          [id number]    SHONDA MARIE,JILLIAN SANTIAGO MD          
 
 
 
Electronically Signed by JILLIAN LI on 08/29/19 at 0842
 
 
 
 
 
 
 
 
 
 
 
 
 
 
 
 
 
 
 
 
 
 
 
 
 
 
 
 
 
 
 
 
 
 
 
 
 
 
 
 
CC:                                                             [id number]
DICTATION DATE: 08/21/19 1439     :     08/21/19 1455      DEP CLI 
                                                                      08/20/19
[place] [id number] [place] [phone]

## 2019-09-08 ENCOUNTER — HOSPITAL ENCOUNTER (INPATIENT)
Dept: HOSPITAL 84 - D.ER | Age: 79
LOS: 2 days | Discharge: HOME | DRG: 175 | End: 2019-09-10
Attending: INTERNAL MEDICINE | Admitting: INTERNAL MEDICINE
Payer: MEDICARE

## 2019-09-08 VITALS — SYSTOLIC BLOOD PRESSURE: 151 MMHG | DIASTOLIC BLOOD PRESSURE: 98 MMHG

## 2019-09-08 VITALS — DIASTOLIC BLOOD PRESSURE: 87 MMHG | SYSTOLIC BLOOD PRESSURE: 142 MMHG

## 2019-09-08 VITALS — SYSTOLIC BLOOD PRESSURE: 152 MMHG | DIASTOLIC BLOOD PRESSURE: 56 MMHG

## 2019-09-08 VITALS — SYSTOLIC BLOOD PRESSURE: 145 MMHG | DIASTOLIC BLOOD PRESSURE: 77 MMHG

## 2019-09-08 VITALS — SYSTOLIC BLOOD PRESSURE: 148 MMHG | DIASTOLIC BLOOD PRESSURE: 84 MMHG

## 2019-09-08 VITALS — DIASTOLIC BLOOD PRESSURE: 85 MMHG | SYSTOLIC BLOOD PRESSURE: 149 MMHG

## 2019-09-08 VITALS
BODY MASS INDEX: 42.66 KG/M2 | BODY MASS INDEX: 42.66 KG/M2 | BODY MASS INDEX: 42.66 KG/M2 | WEIGHT: 211.6 LBS | HEIGHT: 59 IN

## 2019-09-08 VITALS — DIASTOLIC BLOOD PRESSURE: 72 MMHG | SYSTOLIC BLOOD PRESSURE: 153 MMHG

## 2019-09-08 VITALS — DIASTOLIC BLOOD PRESSURE: 73 MMHG | SYSTOLIC BLOOD PRESSURE: 137 MMHG

## 2019-09-08 VITALS — DIASTOLIC BLOOD PRESSURE: 97 MMHG | SYSTOLIC BLOOD PRESSURE: 168 MMHG

## 2019-09-08 VITALS — SYSTOLIC BLOOD PRESSURE: 164 MMHG | DIASTOLIC BLOOD PRESSURE: 96 MMHG

## 2019-09-08 VITALS — DIASTOLIC BLOOD PRESSURE: 73 MMHG | SYSTOLIC BLOOD PRESSURE: 136 MMHG

## 2019-09-08 VITALS — SYSTOLIC BLOOD PRESSURE: 149 MMHG | DIASTOLIC BLOOD PRESSURE: 95 MMHG

## 2019-09-08 VITALS — SYSTOLIC BLOOD PRESSURE: 134 MMHG | DIASTOLIC BLOOD PRESSURE: 81 MMHG

## 2019-09-08 VITALS — SYSTOLIC BLOOD PRESSURE: 150 MMHG | DIASTOLIC BLOOD PRESSURE: 82 MMHG

## 2019-09-08 VITALS — DIASTOLIC BLOOD PRESSURE: 111 MMHG | SYSTOLIC BLOOD PRESSURE: 142 MMHG

## 2019-09-08 VITALS — SYSTOLIC BLOOD PRESSURE: 132 MMHG | DIASTOLIC BLOOD PRESSURE: 110 MMHG

## 2019-09-08 VITALS — SYSTOLIC BLOOD PRESSURE: 136 MMHG | DIASTOLIC BLOOD PRESSURE: 73 MMHG

## 2019-09-08 VITALS — SYSTOLIC BLOOD PRESSURE: 121 MMHG | DIASTOLIC BLOOD PRESSURE: 76 MMHG

## 2019-09-08 VITALS — DIASTOLIC BLOOD PRESSURE: 76 MMHG | SYSTOLIC BLOOD PRESSURE: 108 MMHG

## 2019-09-08 VITALS — DIASTOLIC BLOOD PRESSURE: 86 MMHG | SYSTOLIC BLOOD PRESSURE: 148 MMHG

## 2019-09-08 VITALS — SYSTOLIC BLOOD PRESSURE: 158 MMHG | DIASTOLIC BLOOD PRESSURE: 97 MMHG

## 2019-09-08 VITALS — SYSTOLIC BLOOD PRESSURE: 161 MMHG | DIASTOLIC BLOOD PRESSURE: 50 MMHG

## 2019-09-08 DIAGNOSIS — J45.909: ICD-10-CM

## 2019-09-08 DIAGNOSIS — I26.92: Primary | ICD-10-CM

## 2019-09-08 DIAGNOSIS — Z86.711: ICD-10-CM

## 2019-09-08 DIAGNOSIS — I51.89: ICD-10-CM

## 2019-09-08 DIAGNOSIS — I27.20: ICD-10-CM

## 2019-09-08 DIAGNOSIS — I10: ICD-10-CM

## 2019-09-08 DIAGNOSIS — E78.00: ICD-10-CM

## 2019-09-08 DIAGNOSIS — E04.9: ICD-10-CM

## 2019-09-08 DIAGNOSIS — J96.01: ICD-10-CM

## 2019-09-08 LAB
ALBUMIN SERPL-MCNC: 3.6 G/DL (ref 3.4–5)
ALP SERPL-CCNC: 110 U/L (ref 46–116)
ALT SERPL-CCNC: 34 U/L (ref 10–68)
ANION GAP SERPL CALC-SCNC: 15.1 MMOL/L (ref 8–16)
APPEARANCE UR: CLEAR
APTT BLD: 30.6 SECONDS (ref 22.8–39.4)
APTT BLD: 38.9 SECONDS (ref 22.8–39.4)
BASOPHILS NFR BLD AUTO: 0.1 % (ref 0–2)
BILIRUB SERPL-MCNC: 0.28 MG/DL (ref 0.2–1.3)
BILIRUB SERPL-MCNC: NEGATIVE MG/DL
BUN SERPL-MCNC: 15 MG/DL (ref 7–18)
CALCIUM SERPL-MCNC: 8.7 MG/DL (ref 8.5–10.1)
CHLORIDE SERPL-SCNC: 103 MMOL/L (ref 98–107)
CO2 SERPL-SCNC: 25.7 MMOL/L (ref 21–32)
COLOR UR: YELLOW
CREAT SERPL-MCNC: 0.9 MG/DL (ref 0.6–1.3)
D DIMER PPP FEU-MCNC: > 20 UG/MLFEU (ref 0.2–0.54)
EOSINOPHIL NFR BLD: 0.3 % (ref 0–7)
ERYTHROCYTE [DISTWIDTH] IN BLOOD BY AUTOMATED COUNT: 15.8 % (ref 11.5–14.5)
ERYTHROCYTE [DISTWIDTH] IN BLOOD BY AUTOMATED COUNT: 15.9 % (ref 11.5–14.5)
GLOBULIN SER-MCNC: 3.8 G/L
GLUCOSE SERPL-MCNC: 147 MG/DL (ref 74–106)
GLUCOSE SERPL-MCNC: NEGATIVE MG/DL
HCT VFR BLD CALC: 42 % (ref 36–48)
HCT VFR BLD CALC: 44.1 % (ref 36–48)
HGB BLD-MCNC: 14.1 G/DL (ref 12–16)
HGB BLD-MCNC: 15.1 G/DL (ref 12–16)
IMM GRANULOCYTES NFR BLD: 0.8 % (ref 0–5)
INR PPP: 1.37 (ref 0.85–1.17)
INR PPP: 1.44 (ref 0.85–1.17)
KETONES UR STRIP-MCNC: NEGATIVE MG/DL
LIPASE SERPL-CCNC: 174 U/L (ref 73–393)
LYMPHOCYTES NFR BLD AUTO: 26.4 % (ref 15–50)
MAGNESIUM SERPL-MCNC: 2.1 MG/DL (ref 1.8–2.4)
MCH RBC QN AUTO: 30.1 PG (ref 26–34)
MCH RBC QN AUTO: 30.6 PG (ref 26–34)
MCHC RBC AUTO-ENTMCNC: 33.6 G/DL (ref 31–37)
MCHC RBC AUTO-ENTMCNC: 34.2 G/DL (ref 31–37)
MCV RBC: 89.3 FL (ref 80–100)
MCV RBC: 89.7 FL (ref 80–100)
MONOCYTES NFR BLD: 6.2 % (ref 2–11)
NEUTROPHILS NFR BLD AUTO: 66.2 % (ref 40–80)
NITRITE UR-MCNC: NEGATIVE MG/ML
NT-PROBNP SERPL-MCNC: 61 PG/ML (ref 0–450)
OSMOLALITY SERPL CALC.SUM OF ELEC: 282 MOSM/KG (ref 275–300)
PH UR STRIP: 6 [PH] (ref 5–6)
PLATELET # BLD: 200 10X3/UL (ref 130–400)
PLATELET # BLD: 212 10X3/UL (ref 130–400)
PMV BLD AUTO: 9.1 FL (ref 7.4–10.4)
PMV BLD AUTO: 9.1 FL (ref 7.4–10.4)
POTASSIUM SERPL-SCNC: 3.8 MMOL/L (ref 3.5–5.1)
PROT SERPL-MCNC: 7.4 G/DL (ref 6.4–8.2)
PROT UR-MCNC: NEGATIVE MG/DL
PROTHROMBIN TIME: 16.3 SECONDS (ref 11.6–15)
PROTHROMBIN TIME: 17 SECONDS (ref 11.6–15)
RBC # BLD AUTO: 4.68 10X6/UL (ref 4–5.4)
RBC # BLD AUTO: 4.94 10X6/UL (ref 4–5.4)
SODIUM SERPL-SCNC: 140 MMOL/L (ref 136–145)
SP GR UR STRIP: 1.01 (ref 1–1.02)
TROPONIN I SERPL-MCNC: 0.12 NG/ML (ref 0–0.06)
UROBILINOGEN UR-MCNC: NORMAL MG/DL
WBC # BLD AUTO: 11.9 10X3/UL (ref 4.8–10.8)
WBC # BLD AUTO: 8.8 10X3/UL (ref 4.8–10.8)

## 2019-09-08 NOTE — NUR
PT IN WITH C/O SOB AND CHEST PAIN THAT HAS RESOLVED. EMS PLACED PATIENT ON
OXYGEN, SHE STATES IT MADE HER FEEL MUCH BETTER. DENIES CHEST PAIN AT THIS
TIME. PROVIDER TO BEDSIDE, FRIEND TO BEDSIDE.

## 2019-09-08 NOTE — NUR
0700 AWAKE ALERT ORIENTED  VOICES NO C/O PAIN  ASSESSMENT COMPLETE IN IV
INFUSING AT THIS TIME LONGO PATENT DDRAINING YELLOW URINE  PT REPOSITIONS SELF
IN BED WITHOUT DIFFICULTY

## 2019-09-08 NOTE — NUR
DR FREY SPEAKING WITH [facility] FOR POSSIBLE TRANSFER. PT DECLINED. NO ICU BEDS
AVAILABLE.

## 2019-09-08 NOTE — NUR
PT ARRIVED VIA BED. MONITOR ATTACHED TO PT. DENIES PAIN AT THIS TIME. A/O X4.
CL IN REACH. O2 ON VIA NC AT 4L. WCTM

## 2019-09-08 NOTE — NUR
RECEIVED PT ALERT AND ORIENTED. MONITORS ATTACHED. VITAL SIGNS STABLE. PT ON
3.5L OF OXYGEN BY NC. CL IN REACH. BED LOWEST POSITION.

## 2019-09-09 VITALS — DIASTOLIC BLOOD PRESSURE: 78 MMHG | SYSTOLIC BLOOD PRESSURE: 129 MMHG

## 2019-09-09 VITALS — DIASTOLIC BLOOD PRESSURE: 77 MMHG | SYSTOLIC BLOOD PRESSURE: 131 MMHG

## 2019-09-09 VITALS — SYSTOLIC BLOOD PRESSURE: 144 MMHG | DIASTOLIC BLOOD PRESSURE: 64 MMHG

## 2019-09-09 VITALS — SYSTOLIC BLOOD PRESSURE: 126 MMHG | DIASTOLIC BLOOD PRESSURE: 68 MMHG

## 2019-09-09 VITALS — SYSTOLIC BLOOD PRESSURE: 150 MMHG | DIASTOLIC BLOOD PRESSURE: 86 MMHG

## 2019-09-09 VITALS — SYSTOLIC BLOOD PRESSURE: 123 MMHG | DIASTOLIC BLOOD PRESSURE: 70 MMHG

## 2019-09-09 VITALS — SYSTOLIC BLOOD PRESSURE: 112 MMHG | DIASTOLIC BLOOD PRESSURE: 75 MMHG

## 2019-09-09 VITALS — DIASTOLIC BLOOD PRESSURE: 71 MMHG | SYSTOLIC BLOOD PRESSURE: 142 MMHG

## 2019-09-09 VITALS — SYSTOLIC BLOOD PRESSURE: 144 MMHG | DIASTOLIC BLOOD PRESSURE: 89 MMHG

## 2019-09-09 VITALS — DIASTOLIC BLOOD PRESSURE: 89 MMHG | SYSTOLIC BLOOD PRESSURE: 144 MMHG

## 2019-09-09 VITALS — SYSTOLIC BLOOD PRESSURE: 129 MMHG | DIASTOLIC BLOOD PRESSURE: 78 MMHG

## 2019-09-09 VITALS — SYSTOLIC BLOOD PRESSURE: 122 MMHG | DIASTOLIC BLOOD PRESSURE: 85 MMHG

## 2019-09-09 VITALS — DIASTOLIC BLOOD PRESSURE: 82 MMHG | SYSTOLIC BLOOD PRESSURE: 142 MMHG

## 2019-09-09 LAB
ALBUMIN SERPL-MCNC: 3.1 G/DL (ref 3.4–5)
ALP SERPL-CCNC: 94 U/L (ref 46–116)
ALT SERPL-CCNC: 23 U/L (ref 10–68)
ANION GAP SERPL CALC-SCNC: 12.4 MMOL/L (ref 8–16)
BASOPHILS NFR BLD AUTO: 0.3 % (ref 0–2)
BILIRUB SERPL-MCNC: 0.38 MG/DL (ref 0.2–1.3)
BUN SERPL-MCNC: 8 MG/DL (ref 7–18)
CALCIUM SERPL-MCNC: 8.4 MG/DL (ref 8.5–10.1)
CHLORIDE SERPL-SCNC: 105 MMOL/L (ref 98–107)
CO2 SERPL-SCNC: 28.3 MMOL/L (ref 21–32)
CREAT SERPL-MCNC: 0.8 MG/DL (ref 0.6–1.3)
EOSINOPHIL NFR BLD: 0.7 % (ref 0–7)
ERYTHROCYTE [DISTWIDTH] IN BLOOD BY AUTOMATED COUNT: 15.9 % (ref 11.5–14.5)
GLOBULIN SER-MCNC: 3.2 G/L
GLUCOSE SERPL-MCNC: 116 MG/DL (ref 74–106)
HCT VFR BLD CALC: 44.4 % (ref 36–48)
HGB BLD-MCNC: 15 G/DL (ref 12–16)
IMM GRANULOCYTES NFR BLD: 0.7 % (ref 0–5)
LYMPHOCYTES NFR BLD AUTO: 45.5 % (ref 15–50)
MAGNESIUM SERPL-MCNC: 2.1 MG/DL (ref 1.8–2.4)
MCH RBC QN AUTO: 30.3 PG (ref 26–34)
MCHC RBC AUTO-ENTMCNC: 33.8 G/DL (ref 31–37)
MCV RBC: 89.7 FL (ref 80–100)
MONOCYTES NFR BLD: 4.8 % (ref 2–11)
NEUTROPHILS NFR BLD AUTO: 48 % (ref 40–80)
OSMOLALITY SERPL CALC.SUM OF ELEC: 281 MOSM/KG (ref 275–300)
PHOSPHATE SERPL-MCNC: 3.5 MG/DL (ref 2.5–4.9)
PLATELET # BLD: 244 10X3/UL (ref 130–400)
PMV BLD AUTO: 9.2 FL (ref 7.4–10.4)
POTASSIUM SERPL-SCNC: 3.7 MMOL/L (ref 3.5–5.1)
PROT SERPL-MCNC: 6.3 G/DL (ref 6.4–8.2)
RBC # BLD AUTO: 4.95 10X6/UL (ref 4–5.4)
SODIUM SERPL-SCNC: 142 MMOL/L (ref 136–145)
WBC # BLD AUTO: 10.4 10X3/UL (ref 4.8–10.8)

## 2019-09-09 NOTE — NUR
left hand piv infitrated. bruising and edema noted. tip intact, pressure held
and wrapped. right hand piv patent. flushed and aspirated. heparing began in
left hand

## 2019-09-09 NOTE — NUR
RECEIVED TO ROOM 2207 VIA BED FROM ICU. A/O X3. REQUESTED LONGO BE D/C. THIS
WAS DONE WITHOUT COMPLICATIONS, TIP INTACT. UP TO BR PER SELF WITH SBA. HAD
LARGE NORMAL BM. SKIN CARE PER SELF. DENIES NEEDS.

## 2019-09-09 NOTE — NUR
0700 AWAKE ALELRT ORIENTED  HEPARIN INFUSING PER PROTOCOL AT 1200 UNITS/HR
NEXT APTT AT 1145  ASSESSMENT COMPLETE  NO C/O PAIN INTRODUCED MYSELF AND
PLACEDD RN NAME ON WHITE BOARD

## 2019-09-09 NOTE — NUR
PT RESTING IN BED. EYES CLOSED. NO SIGNS OF DISTRESS. BREATHING EVEN AND
UNLABORED. IV SITE RT HAND DRESSING CLEAN DRY AND INTACT. NO SINGS OF
INFECTION. BOWEL SOUNDS ACTIVE. LT AND RT ARM BRUISES. LT KNEE OLD INCISION.
WILL CONTINUE PLAN OF CARE. CALL LIGHT IN REACH. BED LOWERED AND LOCKED. BED
RAILS UPX2.

## 2019-09-10 VITALS — SYSTOLIC BLOOD PRESSURE: 151 MMHG | DIASTOLIC BLOOD PRESSURE: 91 MMHG

## 2019-09-10 VITALS — DIASTOLIC BLOOD PRESSURE: 71 MMHG | SYSTOLIC BLOOD PRESSURE: 155 MMHG

## 2019-09-10 VITALS — SYSTOLIC BLOOD PRESSURE: 151 MMHG | DIASTOLIC BLOOD PRESSURE: 67 MMHG

## 2019-09-10 LAB
ANION GAP SERPL CALC-SCNC: 10.2 MMOL/L (ref 8–16)
BASOPHILS NFR BLD AUTO: 0.2 % (ref 0–2)
BUN SERPL-MCNC: 11 MG/DL (ref 7–18)
CALCIUM SERPL-MCNC: 8.4 MG/DL (ref 8.5–10.1)
CHLORIDE SERPL-SCNC: 104 MMOL/L (ref 98–107)
CO2 SERPL-SCNC: 27.3 MMOL/L (ref 21–32)
CREAT SERPL-MCNC: 0.9 MG/DL (ref 0.6–1.3)
EOSINOPHIL NFR BLD: 0.9 % (ref 0–7)
ERYTHROCYTE [DISTWIDTH] IN BLOOD BY AUTOMATED COUNT: 15.8 % (ref 11.5–14.5)
GLUCOSE SERPL-MCNC: 116 MG/DL (ref 74–106)
HAPTOGLOB SERPL-MCNC: 191 MG/DL (ref 34–200)
HCT VFR BLD CALC: 39.6 % (ref 36–48)
HGB BLD-MCNC: 13.3 G/DL (ref 12–16)
IMM GRANULOCYTES NFR BLD: 0.4 % (ref 0–5)
LYMPHOCYTES NFR BLD AUTO: 40.9 % (ref 15–50)
MCH RBC QN AUTO: 29.9 PG (ref 26–34)
MCHC RBC AUTO-ENTMCNC: 33.6 G/DL (ref 31–37)
MCV RBC: 89 FL (ref 80–100)
MONOCYTES NFR BLD: 6.8 % (ref 2–11)
NEUTROPHILS NFR BLD AUTO: 50.8 % (ref 40–80)
OSMOLALITY SERPL CALC.SUM OF ELEC: 275 MOSM/KG (ref 275–300)
PLATELET # BLD: 253 10X3/UL (ref 130–400)
PMV BLD AUTO: 9.2 FL (ref 7.4–10.4)
POTASSIUM SERPL-SCNC: 3.5 MMOL/L (ref 3.5–5.1)
RBC # BLD AUTO: 4.45 10X6/UL (ref 4–5.4)
SODIUM SERPL-SCNC: 138 MMOL/L (ref 136–145)
WBC # BLD AUTO: 9.4 10X3/UL (ref 4.8–10.8)

## 2019-09-10 NOTE — NUR
PT TRANSPORTED FROM ROOM VIA WHEELCHAIR BY HOSPITAL VOLUNTEER STAFF. PT DENIES
FURTHER QUESTIONS/CONCERNS AT THIS TIME.

## 2019-09-10 NOTE — MORECARE
CASE MANAGEMENT DISCHARGE SUMMARY
 
 
PATIENT: SHONDA MARIE                      UNIT: [id number]
ACCOUNT#: [id number]                       ADM DATE: 19
AGE: 79     : [birth year]  SEX: F            ROOM/BED: D.2202    
AUTHOR: JOSE,DOC                             PHYSICIAN:                               
 
REFERRING PHYSICIAN: CHLOÉ FRITZ MD               
DATE OF SERVICE: 09/10/19
Discharge Plan
 
 
Patient Name: SHONDA MARIE
Facility: [facility]:[place]
Encounter #: [id number]
Medical Record #: [id number]
: 1940
Planned Disposition: Home or Self Care
Anticipated Discharge Date: 
 
Discharge Date: 
Expected LOS: 
Initial Reviewer: [id number]
Initial Review Date: 2019
Generated: 9/10/19   2:31 pm 
Comments
 
DCP- Discharge Planning
 
Updated by [email or web address]: Kristin Salvador on 9/10/19  12:32 pm CT
Patient Name: SHONDA MARIE                                     
Admission Status: ER   
Accout number: [id number]                              
Admission Date: 2019   
: 1940                                                        
Admission Diagnosis:OTHER PULMONARY EMBOLISM WITHOUT ACUTE COR PULMONALE   
Attending: CHLOÉ FRITZ                                                
Current LOS:  2   
  
Anticipated DC Date:    
Planned Disposition: Home or Self Care   
Primary Insurance: MEDICARE A & B   
  
  
Discharge Planning Comments:   
  
CM met with patient to complete initial dc planning assessment.  CM educated 
patient on the CM role and verbal consent given by patient to complete 
assessment.   Patient lives at home independently by herself. At discharge 
 
patient plans to return home and feels this is a safe discharge. Her 
girlfriend will be her  home.  CM discussed availability of home health,
 rehab services, and medical equipment. She has everything she could need at 
home. BSC, grabber, walker, cane, elevated toilet seat.  Patient denied known 
discharge needs at this time. CM will continue to follow and will assist as 
needed with dc plans/needs.    
  
  
  
  
: Kristin Salvador
 DCPIA - Discharge Planning Initial Assessment
 
Updated by [id number]: Kristin Salvador on 9/10/19   1:27 pm
*  Is the patient Alert and Oriented?
Yes
*  How many steps to enter\exit or inside your home? RAMP *  PCP MALATHI MERCADO *  Pharmacy
[facility]
*  Preadmission Environment
Home Alone
*  ADLs
Independent
*  Equipment
Bedside Commode
Cane
Elevated Toliet Seat
Grab Bars
Rolling Walker
Shower Chair
Walker
*  Other Equipment
GRABBER
*  List name and contact numbers for known caregivers / representatives who 
currently or will assist patient after discharge:
DM TAVAREZ   [phone]
 
*  Verbal permission to speak to the caregivers and representatives has been 
obtained from the patient.
N/A
*  Community resources currently utilized
None
*  Please name any agencies selected above.
DOES HAVE LONG TERM CARE INSURANCE
*  Additional services required to return to the preadmission environment?
No
*  Can the patient safely return to the preadmission environment?
Yes
*  Has this patient been hospitalized within the prior 30 days at any 
hospital?
No
 
 
 
 
 
 
 
Patient Name: SHONDA MARIE
Encounter #: [id number]
Page [phone]
 
 
 
 
 
Electronically Signed by TOMEKA GARCIA on 09/10/19 at 1332
 
 
 
 
 
 
**All edits/amendments must be made on the electronic document**
 
DICTATION DATE: 09/10/19 1331     : SHARON  09/10/19 133     
RPT#: [id number]                                DC DATE:        
                                               STATUS: ADM IN  
[facility]
 [place] [address]
***END OF REPORT***

## 2019-09-10 NOTE — NUR
PT IS RESTING IN BED WITH EYES CLOSED. RESPIRATIONS ARE EVEN AND UNLABORED. PT
IS EASILY AROUSED WITH VERBAL STIMULATION. PT IS AAO X 4 UPON AROUSAL. PT
DENIES PRESENCE OF SOB/DYSPNEA. PT ON ROOM AIR. PT DENIES PRESENCE OF PAIN/N/V
AT THIS TIME. BED IS IN THE LOWEST POSITION. CALL LIGHT AND BEDSIDE TABLE ARE
WITHIN REACH. SIDE RAILS X 2. PT DENIES FURTHER NEEDS. WILL CONT TO MONITOR.

## 2019-09-11 NOTE — EC
PATIENT:SHONDA MARIE                  DATE OF SERVICE: 09/08/19
SEX: F                                  MEDICAL RECORD: [id number]
DATE OF BIRTH: [birth year]                        LOCATION:[facility]
AGE OF PATIENT: 79                             ADMISSION DATE: 09/08/19
 
REFERRING PHYSICIAN:                               
 
INTERPRETING PHYSICIAN: ARYAN BREWER MD             
 
 
 
                             ECHOCARDIOGRAM REPORT
  ECHO CHARGES 5               ECHO LIMITED                  Date: 09/08/19
 
 
 
CLINICAL DIAGNOSIS: PULMONARY HTN                 
 
                         ECHOCARDIOGRAPHIC MEASUREMENTS
      (adult normal given)
   AC root (d.<3.7cm) 0    cm   LV Septum d (<1.2 cm> 0    cm
      Valve Excursion 0    cm     LV Septum (systole) 0    cm
Left Atria (s.<4.0cm> 0    cm          LVPW d(<1.2cm) 0    cm
        RV (d.<2.3cm) 0    cm           LVPW (sytole) 0    cm
  LV diastole(<5.6CM) 0    cm       MV E-F(>70mm/sec) 0    cm
           LV systole 0    cm           LVOT Diameter 0    cm
       MV exc.(>10mm) 0    cm
Est.ejection fraction (50-75%)     %
 
   DOPPLER:
     LVIT      cm/sec A 0    cm/sec E 0     cm/sec
       LA 0    cm/sec      RVSP 39.9 mmHg
     LVOT 0    cm/sec   AOP1/2T      m/s
  Asc. Ao 0    cm/sec
     RVOT 0    cm/sec
       RA 0    cm/sec
       PA 0    cm/sec
 AV Gradient Peak 0    mmHg  AV Mean 0    mmHg  AV Area 0    cm
 MV Gradient Peak 0    mmHg  MV Mean      mmHg  MV Area      cm
   COMMENTS:                                              
 
 
 Cardiac Sonographer: Jer MONK             
      Cardiologist: Feng Brewer                
             TAPE# PACS           
                                       Pericardial Effusion N                        
 
 
DATE OF SERVICE:  
 
Echocardiogram
 
FINDINGS:
1.  Left ventricular chamber size is within normal limits.  Left ventricular
systolic function is normal.  Overall ejection fraction estimated at 55% to 60%.
2.  Left atrium, right atrium, and right ventricular chamber sizes are within
normal limits.
3.  Valvular structures have normal structure and motion.
 
 
 
ECHOCARDIOGRAM REPORT                          [id number]    SHONDA MARIE          
 
 
4.  Doppler interrogation reveals mild tricuspid regurgitation, no other
valvular insufficiency or stenosis.  Pulmonary systolic pressure is estimated 39
mmHg.
5.  No evidence of pericardial effusion or left ventricular thrombus.
 
TRANSINT:[id number] Voice Confirmation ID: [id number] DOCUMENT ID: [id number]
                                           
                                           ARYAN BREWER MD             
 
 
 
Electronically Signed by ARYAN BREWER on 09/11/19 at 1432
 
 
 
 
 
 
 
 
 
 
 
 
 
 
 
 
 
 
 
 
 
 
 
 
 
 
 
 
 
 
 
 
 
 
CC:                                                             [id number]
DICTATION DATE: 09/08/19 2312     :     09/09/19 0255      DIS IN  
                                                                      09/10/19
[facility]                                          
[address] [id number]

## 2019-09-12 LAB
PROT S ACT/NOR PPP: 110 % (ref 57–157)
PROT S PPP-ACNC: 143 % (ref 60–150)

## 2019-09-13 LAB
PROT C ACT/NOR PPP: 117 % (ref 73–180)
PROT C AG PPP IA-ACNC: 95 % (ref 60–150)
PROT S ACT/NOR PPP: (no result) %
PROT S ACT/NOR PPP: (no result) %
PROT S PPP-ACNC: (no result) %

## 2019-09-13 NOTE — MORECARE
CASE MANAGEMENT DISCHARGE SUMMARY
 
 
PATIENT: SHONDA MARIE                      UNIT: [id number]
ACCOUNT#: [id number]                       ADM DATE: 19
AGE: 79     : [birth year]  SEX: F            ROOM/BED: D.2202    
AUTHOR: JOSEDOC                             PHYSICIAN:                               
 
REFERRING PHYSICIAN: CHLOÉ FRITZ MD               
DATE OF SERVICE: 19
Discharge Plan
 
 
Patient Name: SHONDA MARIE
Facility: [facility]:[place]
Encounter #: [id number]
Medical Record #: [id number]
: 1940
Planned Disposition: Home or Self Care
Anticipated Discharge Date: 
 
Discharge Date: 09/10/2019
Expected LOS: 0
Initial Reviewer: [email or web address]
Initial Review Date: 2019
Generated: 19   1:46 pm 
Comments
 
DCP- Discharge Planning
 
Updated by [id number]: Kristin Salvador on 9/10/19  12:32 pm CT
Patient Name: SHONDA MARIE                                     
Admission Status: ER   
Accout number: [id number]                              
Admission Date: 2019   
: 1940                                                        
Admission Diagnosis:OTHER PULMONARY EMBOLISM WITHOUT ACUTE COR PULMONALE   
Attending: CHLOÉ FRITZ                                                
Current LOS:  2   
  
Anticipated DC Date:    
Planned Disposition: Home or Self Care   
Primary Insurance: MEDICARE A & B   
  
  
Discharge Planning Comments:   
  
CM met with patient to complete initial dc planning assessment.  CM educated 
patient on the CM role and verbal consent given by patient to complete 
assessment.   Patient lives at home independently by herself. At discharge 
 
patient plans to return home and feels this is a safe discharge. Her 
girlfriend will be her  home.  CM discussed availability of home health,
 rehab services, and medical equipment. She has everything she could need at 
home. BSC, grabber, walker, cane, elevated toilet seat.  Patient denied known 
discharge needs at this time. CM will continue to follow and will assist as 
needed with dc plans/needs.    
  
  
  
  
: Kristin Salvador
 DCPIA - Discharge Planning Initial Assessment
 
Updated by [email or web address]: Kristin Salvador on 9/10/19   1:27 pm
*  Is the patient Alert and Oriented?
Yes
*  How many steps to enter\exit or inside your home? RAMP *  PCP MALATHI MERCADO *  Pharmacy
[facility]
*  Preadmission Environment
Home Alone
*  ADLs
Independent
*  Equipment
Bedside Commode
Cane
Elevated Toliet Seat
Grab Bars
Rolling Walker
Shower Chair
Walker
*  Other Equipment
GRABBER
*  List name and contact numbers for known caregivers / representatives who 
currently or will assist patient after discharge:
DM TAVAREZ   [phone]
 
*  Verbal permission to speak to the caregivers and representatives has been 
obtained from the patient.
N/A
*  Community resources currently utilized
None
*  Please name any agencies selected above.
DOES HAVE LONG TERM CARE INSURANCE
*  Additional services required to return to the preadmission environment?
No
*  Can the patient safely return to the preadmission environment?
Yes
*  Has this patient been hospitalized within the prior 30 days at any 
hospital?
No
 
 
 
 
 
 
 
 
Last DP export: 9/10/19  12:32 p
Patient Name: SHONDA MARIE
Encounter #: [id number]
Page [phone]
 
 
 
 
 
Electronically Signed by TOMEKA GARCIA on 19 at 1246
 
 
 
 
 
 
**All edits/amendments must be made on the electronic document**
 
DICTATION DATE: 196     : SHARON  19 1246     
RPT#: [id number]                                DC DATE:09/10/19
                                               STATUS: DIS IN  
[facility]
 [place] [address]
***END OF REPORT***

## 2019-09-16 LAB
SCREEN APTT: 39.2 SEC (ref 0–51.9)
SCREEN DRVVT: 48.3 SEC (ref 0–47)
THROMBIN TIME: 125.8 SEC (ref 0–23)
TT IMM NP PPP: 105.6 SEC (ref 0–23)
TT P HPASE PPP: 24.6 SEC (ref 0–23)

## 2019-11-20 ENCOUNTER — HOSPITAL ENCOUNTER (OUTPATIENT)
Dept: HOSPITAL 84 - D.RAD | Age: 79
Discharge: HOME | End: 2019-11-20
Attending: INTERNAL MEDICINE
Payer: MEDICARE

## 2019-11-20 VITALS — BODY MASS INDEX: 42.7 KG/M2

## 2019-11-20 DIAGNOSIS — J45.991: ICD-10-CM

## 2019-11-20 DIAGNOSIS — J98.11: Primary | ICD-10-CM

## 2020-03-16 ENCOUNTER — HOSPITAL ENCOUNTER (OUTPATIENT)
Dept: HOSPITAL 84 - D.RAD | Age: 80
Discharge: HOME | End: 2020-03-16
Attending: INTERNAL MEDICINE
Payer: MEDICARE

## 2020-03-16 VITALS — BODY MASS INDEX: 42.7 KG/M2

## 2020-03-16 DIAGNOSIS — R63.4: Primary | ICD-10-CM

## 2020-03-16 DIAGNOSIS — K92.1: ICD-10-CM

## 2020-03-16 DIAGNOSIS — Z86.010: ICD-10-CM

## 2020-09-08 ENCOUNTER — HOSPITAL ENCOUNTER (OUTPATIENT)
Dept: HOSPITAL 84 - D.HCCECHO | Age: 80
Discharge: HOME | End: 2020-09-08
Attending: INTERNAL MEDICINE
Payer: MEDICARE

## 2020-09-08 VITALS — BODY MASS INDEX: 42.7 KG/M2

## 2020-09-08 DIAGNOSIS — R00.2: Primary | ICD-10-CM

## 2020-09-09 NOTE — EC
PATIENT:SHONDA MARIE                  DATE OF SERVICE: 09/08/20
SEX: F                                  MEDICAL RECORD: [id number]
DATE OF BIRTH: [birth year]                        LOCATION:D.[facility]          
AGE OF PATIENT: 80                             ADMISSION DATE: 09/08/20
 
REFERRING PHYSICIAN:                               
 
INTERPRETING PHYSICIAN: JILLIAN LI MD          
 
 
 
                             ECHOCARDIOGRAM REPORT
  ECHO CHARGES 4               ECHO COMPLETE                 Date: 09/08/20
 
 
 
CLINICAL DIAGNOSIS: HX OF PALPITATIONS, AFIB/HTN  
                    ASSESS EF AND VALVES          
                         ECHOCARDIOGRAPHIC MEASUREMENTS
      (adult normal given)
   AC root (d.<3.7cm) 3.3  cm   LV Septum d (<1.2 cm> 1.3  cm
      Valve Excursion 1.6  cm     LV Septum (systole) 1.5  cm
Left Atria (s.<4.0cm> 3.4  cm          LVPW d(<1.2cm) 1.5  cm
        RV (d.<2.3cm) 3.6  cm           LVPW (sytole) 1.7  cm
  LV diastole(<5.6CM) 3.9  cm       MV E-F(>70mm/sec)      cm
           LV systole 2.1  cm           LVOT Diameter 1.7  cm
       MV exc.(>10mm) 1.2  cm
Est.ejection fraction (50-75%)     %
 
   DOPPLER:
     LVIT      cm/sec A 80.0 cm/sec E 66.0  cm/sec
       LA      cm/sec      RVSP 25   mmHg
     LVOT 134  cm/sec   AOP1/2T      m/s
  Asc. Ao 207  cm/sec
     RVOT 97   cm/sec
       RA      cm/sec
         cm/sec
 AV Gradient Peak 17.01mmHg  AV Mean 8.96 mmHg  AV Area 1.3  cm
 MV Gradient Peak 3.51 mmHg  MV Mean 1.44 mmHg  MV Area      cm
   COMMENTS:                                              
 
 
 Cardiac Sonographer: 2               ALFONSO BHATT              
      Cardiologist: 3          Dr. Stubbs             
             TAPE# PACS           
                                       Pericardial Effusion N                        
 
 
DATE OF SERVICE:  09/08/2020
 
Adequate 2D, color flow imaging, spectral Doppler, and M-Mode.
 
LVH is present.  LV internal dimensions are normal.  Wall motion normal.  EF
greater than or equal to 55%.  Aortic valve is sclerotic with minimal leaflet
motion, peak gradient of 17 mmHg puts it in mild AS range.  Trivial AI is
present as well.  Left atrium is normal at 3.4 cm.  Mitral valve shows no
prolapse.  Trace MR.  Right-sided chambers are grossly normal.  Trace to mild
TR.
 
 
 
ECHOCARDIOGRAM REPORT                          [id number]    SHONDA MARIE ANN          
 
 
 
TRANSINT:[id number] Voice Confirmation ID: [id number] DOCUMENT ID: [id number]
                                           
                                           JILLIAN LI MD          
 
 
 
Electronically Signed by JILLIAN LI on 09/09/20 at 0911
 
 
 
 
 
 
 
 
 
 
 
 
 
 
 
 
 
 
 
 
 
 
 
 
 
 
 
 
 
 
 
 
 
 
 
 
 
 
CC:                                                             [id number]
DICTATION DATE: 09/08/20 1505     :     09/08/20 2255      DEP CLI 
                                                                      09/08/20
[place] [phone]

## 2020-10-06 LAB
ANION GAP SERPL CALC-SCNC: 11.8 MMOL/L (ref 8–16)
APTT BLD: 36.8 SECONDS (ref 22.8–39.4)
BASOPHILS NFR BLD AUTO: 0.3 % (ref 0–2)
BUN SERPL-MCNC: 13 MG/DL (ref 7–18)
CALCIUM SERPL-MCNC: 9.3 MG/DL (ref 8.5–10.1)
CHLORIDE SERPL-SCNC: 102 MMOL/L (ref 98–107)
CO2 SERPL-SCNC: 28 MMOL/L (ref 21–32)
CREAT SERPL-MCNC: 0.8 MG/DL (ref 0.6–1.3)
EOSINOPHIL NFR BLD: 1.5 % (ref 0–7)
ERYTHROCYTE [DISTWIDTH] IN BLOOD BY AUTOMATED COUNT: 14.7 % (ref 11.5–14.5)
GLUCOSE SERPL-MCNC: 100 MG/DL (ref 74–106)
HCT VFR BLD CALC: 42.4 % (ref 36–48)
HGB BLD-MCNC: 13.9 G/DL (ref 12–16)
IMM GRANULOCYTES NFR BLD: 0.2 % (ref 0–5)
INR PPP: 0.99 (ref 0.85–1.17)
LYMPHOCYTES NFR BLD AUTO: 45.6 % (ref 15–50)
MCH RBC QN AUTO: 29.7 PG (ref 26–34)
MCHC RBC AUTO-ENTMCNC: 32.8 G/DL (ref 31–37)
MCV RBC: 90.6 FL (ref 80–100)
MONOCYTES NFR BLD: 4.9 % (ref 2–11)
NEUTROPHILS NFR BLD AUTO: 47.5 % (ref 40–80)
OSMOLALITY SERPL CALC.SUM OF ELEC: 275 MOSM/KG (ref 275–300)
PLATELET # BLD: 365 10X3/UL (ref 130–400)
PMV BLD AUTO: 8.6 FL (ref 7.4–10.4)
POTASSIUM SERPL-SCNC: 3.8 MMOL/L (ref 3.5–5.1)
PROTHROMBIN TIME: 13.1 SECONDS (ref 11.6–15)
RBC # BLD AUTO: 4.68 10X6/UL (ref 4–5.4)
SODIUM SERPL-SCNC: 138 MMOL/L (ref 136–145)
WBC # BLD AUTO: 6.1 10X3/UL (ref 4.8–10.8)

## 2020-10-08 ENCOUNTER — HOSPITAL ENCOUNTER (OUTPATIENT)
Dept: HOSPITAL 84 - D.OPS | Age: 80
Discharge: HOME | End: 2020-10-08
Attending: ORTHOPAEDIC SURGERY
Payer: MEDICARE

## 2020-10-08 VITALS — BODY MASS INDEX: 39.66 KG/M2 | WEIGHT: 202 LBS | HEIGHT: 60 IN

## 2020-10-08 VITALS — DIASTOLIC BLOOD PRESSURE: 53 MMHG | SYSTOLIC BLOOD PRESSURE: 179 MMHG

## 2020-10-08 DIAGNOSIS — M65.352: Primary | ICD-10-CM

## 2020-10-08 DIAGNOSIS — I10: ICD-10-CM

## 2020-10-08 DIAGNOSIS — M65.331: ICD-10-CM

## 2020-10-08 DIAGNOSIS — I27.20: ICD-10-CM

## 2020-10-08 DIAGNOSIS — I48.91: ICD-10-CM

## 2020-10-08 DIAGNOSIS — M75.102: ICD-10-CM

## 2020-10-08 DIAGNOSIS — E78.5: ICD-10-CM

## 2020-10-08 DIAGNOSIS — R00.2: ICD-10-CM

## 2020-10-08 DIAGNOSIS — R07.9: ICD-10-CM

## 2020-10-08 DIAGNOSIS — M65.351: ICD-10-CM

## 2020-10-08 NOTE — NUR
1400
IV D/C'D WITH CANNULA INTACT, PRESSURE HELD AND DRSG APPLIED.
DISCHARGE INSTRUCTRUCTIONS GIVEN AND PT VERBALIZED AN UNDERSTANDING.
DISCHARGED IN STABLE CONDIITION AND WITHOUT COMPLAINTS

## 2020-10-09 NOTE — OP
PATIENT NAME:  SHONDA CHAUDHARY                            MEDICAL RECORD: [id number]
:38                                             LOCATION:[facility]          
                                                         ADMISSION DATE:        
SURGEON:  ISAÍAS GATES DO         
 
 
DATE OF OPERATION:  10/08/2020
 
PROCEDURE PERFORMED:  Right middle and small finger trigger finger release and
left small finger A1 pulley release.
 
PREOPERATIVE DIAGNOSES:  Left small finger trigger finger and right middle and
small finger trigger finger.
 
POSTOPERATIVE DIAGNOSES:  Left small finger trigger finger and right middle and
small finger trigger finger.
 
INDICATIONS:  Ms. Chaudhary is an 80-year-old female who has had these trigger
fingers for quite some time.  She wanted something done surgically and this was
the only day she could do it, to get a ride.  I informed her of the risks
including infection, bleeding, damage to nerves and vessels, need for further
surgery, continued pain, loss of motion of the fingers.  She is okay with that
and signed the consent.
 
SURGEON:  Isaías Gates DO
 
DESCRIPTION OF PROCEDURE:  The patient was taken to the operative suite, laid in
supine position, given TIVA, and the bilateral upper extremities were prepped
and draped in sterile fashion.  She was given 2 g of Ancef preoperatively.  The
left upper extremity was then marked out and exsanguinated with an Esmarch and
tourniquet was inflated to 250 mmHg.  It was up for 4 minutes.  I then made an
incision over the distal crease of the palm.  I made blunt dissection down with
Vidhi to the A1 pulley and released it under direct visualization and pulled
out the tendon to ensure there was no catching.  The tourniquet was then let
down.  I injected the site with 0.25% Marcaine with epinephrine in the area
approximately 5 mL of it.  [place], certified surgical first assist, then
closed the site with 4-0 Prolene in a horizontal mattress fashion.  I then
addressed the right hand and exsanguinated with an Esmarch and tourniquet was
inflated to 250 mmHg.  It was up for 10 minutes.  I first did the small finger. 
In the same manner as the left hand, I made an incision over the distal crease
of the palm and made blunt dissection down with the Ragnells to the A1 pulley,
released it distally, and then pulled the tendon out through the incision to
ensure there was no catching.  I then addressed the middle finger.  I made an
oblique incision along the crease and careful dissection down with Ragnells to
the A1 pulley, released it, and again ensured there were no problems with the
tendon gliding.  I pulled it out through the incision.  I then injected both
sites with 0.25% Marcaine with epinephrine, approximately 5 mL in each.  [place], certified surgical first assist, then closed the skin with 4-0 Prolene
in a horizontal mattress fashion and then dressed both hands with Adaptic, 4 x
4, cast padding and lightly wrapped with Coban.  She was then awakened and taken
to recovery in stable condition.  Tourniquet was let down on the right side and
was up for 10 minutes.
 
ESTIMATED BLOOD LOSS:  Minimal.
 
COMPLICATIONS:  None.
 
[id number] Voice Confirmation ID: [id number] DOCUMENT ID: [id number]
 
 
 
OPERATIVE REPORT                               [id number]    SHONDA CHAUDHARY,ISAÍAS ROSE DO         
 
 
 
Electronically Signed by ISAÍAS ENRIQUEZ on 10/09/20 at 0930
 
 
 
 
 
 
 
 
 
 
 
 
 
 
 
 
 
 
 
 
 
 
 
 
 
 
 
 
 
 
 
 
 
 
 
 
 
 
 
 
 
CC:                                                             [id number]
DICTATION DATE: 10/08/20 1302     :     10/08/20 1949      [facility] 
                                                                      10/08/20
[facility]                                          
[address] [place] [address]